# Patient Record
Sex: FEMALE | Race: WHITE | NOT HISPANIC OR LATINO | Employment: UNEMPLOYED | URBAN - METROPOLITAN AREA
[De-identification: names, ages, dates, MRNs, and addresses within clinical notes are randomized per-mention and may not be internally consistent; named-entity substitution may affect disease eponyms.]

---

## 2023-09-25 ENCOUNTER — OFFICE VISIT (OUTPATIENT)
Dept: URGENT CARE | Facility: CLINIC | Age: 63
End: 2023-09-25
Payer: COMMERCIAL

## 2023-09-25 ENCOUNTER — APPOINTMENT (OUTPATIENT)
Dept: RADIOLOGY | Facility: CLINIC | Age: 63
End: 2023-09-25
Payer: COMMERCIAL

## 2023-09-25 VITALS — OXYGEN SATURATION: 98 % | TEMPERATURE: 99.2 F | HEART RATE: 90 BPM | HEIGHT: 68 IN | RESPIRATION RATE: 16 BRPM

## 2023-09-25 DIAGNOSIS — S99.929A INJURY OF GREAT TOENAIL: ICD-10-CM

## 2023-09-25 DIAGNOSIS — M79.671 RIGHT FOOT PAIN: ICD-10-CM

## 2023-09-25 DIAGNOSIS — S99.921A INJURY OF TOE ON RIGHT FOOT, INITIAL ENCOUNTER: Primary | ICD-10-CM

## 2023-09-25 PROCEDURE — 73630 X-RAY EXAM OF FOOT: CPT

## 2023-09-25 PROCEDURE — 99213 OFFICE O/P EST LOW 20 MIN: CPT | Performed by: PHYSICIAN ASSISTANT

## 2023-09-25 RX ORDER — DEXTROAMPHETAMINE SACCHARATE, AMPHETAMINE ASPARTATE, DEXTROAMPHETAMINE SULFATE AND AMPHETAMINE SULFATE 5; 5; 5; 5 MG/1; MG/1; MG/1; MG/1
1 TABLET ORAL 2 TIMES DAILY
COMMUNITY
Start: 2023-08-29

## 2023-09-25 NOTE — PATIENT INSTRUCTIONS
Soak daily with water and hydrogen peroxide  Keep covered to avoid the nail lifting up   Follow up with PCP in 3-5 days. Proceed to  ER if symptoms worsen.

## 2023-09-25 NOTE — PROGRESS NOTES
[facility]        NAME: Remberto Kaplan is a 59 y.o. female  : 1960    MRN: [id number]  DATE: 2023  TIME: 6:02 PM    Assessment and Plan   Injury of toe on right foot, initial encounter [S99.921A]  1. Injury of toe on right foot, initial encounter        2. Right foot pain  XR foot 3+ vw right      3. Injury of great toenail  Ambulatory Referral to Podiatry            Patient Instructions     Soak daily with water and hydrogen peroxide  Keep covered to avoid the nail lifting up   Follow up with PCP in 3-5 days. Proceed to  ER if symptoms worsen. Chief Complaint     Chief Complaint   Patient presents with   • Foot Injury     Pt reports right foot great toe injury that occurred last night when she stumbled into a speaker that was on the floor. C/o anterior pain and toe nail trauma. Washed with soap and water at time of injury with pressure dressing. History of Present Illness       HPI  60 y/o female presents for evaluation of her right great toe. She states last night she accidentally kicked a speaker on the floor. Her nail bent back. She irrigated the area with tap water and then washed with soap and applied a dressing. She continues to note pain in the toe and radiating into the foot. Review of Systems   Review of Systems   Constitutional: Negative for chills and fever. HENT: Negative for ear pain and sore throat. Eyes: Negative for pain and visual disturbance. Respiratory: Negative for cough and shortness of breath. Cardiovascular: Negative for chest pain and palpitations. Gastrointestinal: Negative for abdominal pain and vomiting. Genitourinary: Negative for dysuria and hematuria. Musculoskeletal: Positive for gait problem. Negative for arthralgias and back pain. Skin: Negative for color change and rash. Neurological: Negative for seizures and syncope. All other systems reviewed and are negative.         Current Medications       Current Outpatient Medications:   •  amphetamine-dextroamphetamine (ADDERALL) 20 mg tablet, Take 1 tablet by mouth 2 (two) times a day, Disp: , Rfl:     Current Allergies     Allergies as of 09/25/2023   • (No Known Allergies)            The following portions of the patient's history were reviewed and updated as appropriate: allergies, current medications, past family history, past medical history, past social history, past surgical history and problem list.     No past medical history on file. No past surgical history on file. No family history on file. Medications have been verified. Objective   Pulse 90   Temp 99.2 °F (37.3 °C)   Resp 16   Ht 5' 8" (1.727 m)   SpO2 98%   No LMP recorded. Physical Exam     Physical Exam  Vitals and nursing note reviewed. Constitutional:       General: She is not in acute distress. Appearance: Normal appearance. HENT:      Head: Normocephalic and atraumatic. Cardiovascular:      Rate and Rhythm: Normal rate and regular rhythm. Pulses: Normal pulses. Heart sounds: Normal heart sounds. Pulmonary:      Effort: Pulmonary effort is normal.      Breath sounds: Normal breath sounds. Feet:      Comments: Right foot:  1st MT and MTP joint has no focal tenderness or edema. Nail of the great toe is elevated with subungual bleeding visualized- no active bleeding. The tip of the toe is tender to palpation. The nail can be elevated with light pressure. Skin:     General: Skin is warm and dry. Neurological:      Mental Status: She is alert and oriented to person, place, and time.    Psychiatric:         Mood and Affect: Mood normal.         Behavior: Behavior normal.         Xray right foot:  Preliminary reading: no acute fracture await final reading

## 2025-01-19 ENCOUNTER — HOSPITAL ENCOUNTER (EMERGENCY)
Dept: HOSPITAL 99 - EMR | Age: 65
LOS: 1 days | Discharge: HOME | End: 2025-01-20
Payer: COMMERCIAL

## 2025-01-19 VITALS — BODY MASS INDEX: 31.1 KG/M2

## 2025-01-19 VITALS — DIASTOLIC BLOOD PRESSURE: 118 MMHG | RESPIRATION RATE: 22 BRPM | SYSTOLIC BLOOD PRESSURE: 182 MMHG

## 2025-01-19 VITALS — RESPIRATION RATE: 13 BRPM

## 2025-01-19 DIAGNOSIS — F90.9: ICD-10-CM

## 2025-01-19 DIAGNOSIS — R03.0: ICD-10-CM

## 2025-01-19 DIAGNOSIS — Z98.890: ICD-10-CM

## 2025-01-19 DIAGNOSIS — R51.9: Primary | ICD-10-CM

## 2025-01-19 LAB
ERYTHROCYTE [DISTWIDTH] IN BLOOD BY AUTOMATED COUNT: 12.8 % (ref 11.5–14.5)
HCT VFR BLD AUTO: 39 % (ref 37–47)
HGB BLD-MCNC: 13.1 G/DL (ref 12–16)
MCHC RBC AUTO-ENTMCNC: 33.6 G/DL (ref 33–37)
MCV RBC AUTO: 95.1 FL (ref 81–99)
PLATELET # BLD AUTO: 315 10^3/UL (ref 130–400)

## 2025-01-19 PROCEDURE — 96374 THER/PROPH/DIAG INJ IV PUSH: CPT

## 2025-01-19 PROCEDURE — 99284 EMERGENCY DEPT VISIT MOD MDM: CPT

## 2025-01-19 RX ADMIN — METOCLOPRAMIDE HYDROCHLORIDE 10 MG: 5 INJECTION INTRAMUSCULAR; INTRAVENOUS at 23:39

## 2025-01-20 VITALS — RESPIRATION RATE: 18 BRPM

## 2025-01-20 VITALS — DIASTOLIC BLOOD PRESSURE: 69 MMHG | SYSTOLIC BLOOD PRESSURE: 129 MMHG | RESPIRATION RATE: 15 BRPM

## 2025-01-20 VITALS — RESPIRATION RATE: 15 BRPM

## 2025-01-20 LAB
ALBUMIN SERPL-MCNC: 4 G/DL (ref 3.5–5)
ALP SERPL-CCNC: 99 U/L (ref 38–126)
ALT SERPL-CCNC: 38 U/L (ref 0–35)
AST SERPL-CCNC: 33 U/L (ref 14–36)
BUN SERPL-MCNC: 18 MG/DL (ref 7–17)
CALCIUM SERPL-MCNC: 10.5 MG/DL (ref 8.4–10.2)
CHLORIDE SERPL-SCNC: 103 MMOL/L (ref 98–107)
CO2 SERPL-SCNC: 26 MMOL/L (ref 22–30)
EGFR: > 60
ESTIMATED CREATININE CLEARANCE: 98 ML/MIN
GLUCOSE SERPL-MCNC: 140 MG/DL (ref 70–99)
POTASSIUM SERPL-SCNC: 4.3 MMOL/L (ref 3.5–5.1)
PROT SERPL-MCNC: 6.7 G/DL (ref 6.3–8.2)
SODIUM SERPL-SCNC: 136 MMOL/L (ref 135–145)

## 2025-06-18 ENCOUNTER — HOSPITAL ENCOUNTER (OUTPATIENT)
Dept: HOSPITAL 99 - RAD | Age: 65
End: 2025-06-18
Payer: COMMERCIAL

## 2025-06-18 DIAGNOSIS — G43.019: Primary | ICD-10-CM

## 2025-07-14 ENCOUNTER — HOSPITAL ENCOUNTER (OUTPATIENT)
Dept: HOSPITAL 99 - RAD | Age: 65
End: 2025-07-14
Payer: COMMERCIAL

## 2025-07-14 DIAGNOSIS — D16.4: ICD-10-CM

## 2025-07-14 DIAGNOSIS — C90.01: Primary | ICD-10-CM
